# Patient Record
Sex: FEMALE | Race: WHITE | NOT HISPANIC OR LATINO | Employment: FULL TIME | URBAN - METROPOLITAN AREA
[De-identification: names, ages, dates, MRNs, and addresses within clinical notes are randomized per-mention and may not be internally consistent; named-entity substitution may affect disease eponyms.]

---

## 2018-01-31 ENCOUNTER — OFFICE VISIT - RIVER FALLS (OUTPATIENT)
Dept: FAMILY MEDICINE | Facility: CLINIC | Age: 23
End: 2018-01-31

## 2018-01-31 ASSESSMENT — MIFFLIN-ST. JEOR: SCORE: 1961.72

## 2018-08-28 ENCOUNTER — OFFICE VISIT - RIVER FALLS (OUTPATIENT)
Dept: FAMILY MEDICINE | Facility: CLINIC | Age: 23
End: 2018-08-28

## 2018-08-28 ASSESSMENT — MIFFLIN-ST. JEOR: SCORE: 1960.81

## 2019-01-11 ENCOUNTER — OFFICE VISIT - RIVER FALLS (OUTPATIENT)
Dept: FAMILY MEDICINE | Facility: CLINIC | Age: 24
End: 2019-01-11

## 2019-01-11 ASSESSMENT — MIFFLIN-ST. JEOR: SCORE: 2026.13

## 2019-10-02 ENCOUNTER — OFFICE VISIT - RIVER FALLS (OUTPATIENT)
Dept: FAMILY MEDICINE | Facility: CLINIC | Age: 24
End: 2019-10-02

## 2019-10-03 LAB — TSH SERPL DL<=0.005 MIU/L-ACNC: 2.01 MIU/L

## 2019-10-07 ENCOUNTER — COMMUNICATION - RIVER FALLS (OUTPATIENT)
Dept: FAMILY MEDICINE | Facility: CLINIC | Age: 24
End: 2019-10-07

## 2021-02-10 ENCOUNTER — AMBULATORY - RIVER FALLS (OUTPATIENT)
Dept: FAMILY MEDICINE | Facility: CLINIC | Age: 26
End: 2021-02-10

## 2021-02-10 ENCOUNTER — OFFICE VISIT - RIVER FALLS (OUTPATIENT)
Dept: FAMILY MEDICINE | Facility: CLINIC | Age: 26
End: 2021-02-10

## 2021-02-10 LAB — DEPRECATED S PYO AG THROAT QL EIA: DETECTED

## 2021-02-12 ENCOUNTER — COMMUNICATION - RIVER FALLS (OUTPATIENT)
Dept: FAMILY MEDICINE | Facility: CLINIC | Age: 26
End: 2021-02-12

## 2021-02-12 ENCOUNTER — OFFICE VISIT - RIVER FALLS (OUTPATIENT)
Dept: FAMILY MEDICINE | Facility: CLINIC | Age: 26
End: 2021-02-12

## 2021-02-12 LAB
ERYTHROCYTE [DISTWIDTH] IN BLOOD BY AUTOMATED COUNT: 13.1 % (ref 11.5–15.5)
HCT VFR BLD AUTO: 46.4 % (ref 33–51)
HGB BLD-MCNC: 15.6 G/DL (ref 12–16)
MCH RBC QN AUTO: 31 PG (ref 26–34)
MCHC RBC AUTO-ENTMCNC: 33.6 G/DL (ref 32–36)
MCV RBC AUTO: 92 FL (ref 80–100)
PLATELET # BLD AUTO: 228 10*3/UL (ref 140–440)
PMV BLD: 9.4 FL (ref 6.5–11)
RBC # BLD AUTO: 5.04 10*6/UL (ref 4–5.2)
WBC # BLD AUTO: 10.3 10*3/UL (ref 4.5–11)

## 2021-02-12 ASSESSMENT — MIFFLIN-ST. JEOR: SCORE: 1835.85

## 2021-02-14 ENCOUNTER — NURSE TRIAGE (OUTPATIENT)
Dept: NURSING | Facility: CLINIC | Age: 26
End: 2021-02-14

## 2021-02-14 NOTE — TELEPHONE ENCOUNTER
Patient reports she had 2 appointments with a provider in the past week for strep.  Was prescribed 2 different antibiotics along with Tramadol.     Getting a rash on stomach, neck, face and inner arms.  Noticed it a couple days ago and now has not gone away.     Started taking the 1st prescribed antibiotic on 2/10/2020.  Took on the 10, 11 and morning of the 12th.     Started new antibiotic on the afternoon of the 12th.  Also started Tramadol on the 12th.     Noticed the rash prior to starting the 2nd antibiotic and tramadol.     Rash does not itch.     Advised to see PCP within 24 hours per protocol and stop taking new medications until able to see MD.     Alondra Varner RN/[facility]    COVID 19 Nurse Triage Plan/Patient Instructions    Please be aware that novel coronavirus (COVID-19) may be circulating in the community. If you develop symptoms such as fever, cough, or SOB or if you have concerns about the presence of another infection including coronavirus (COVID-19), please contact your health care provider or visit [email or web address].     Disposition/Instructions    Virtual Visit with provider recommended. Reference Visit Selection Guide.    Thank you for taking steps to prevent the spread of this virus.  o Limit your contact with others.  o Wear a simple mask to cover your cough.  o Wash your hands well and often.    Resources    [facility]: About COVID-19: [email or web address]    CDC: What to Do If You're Sick: www.cdc.gov/coronavirus/2019-ncov/about/steps-when-sick.html    CDC: Ending Home Isolation: www.cdc.gov/coronavirus/2019-ncov/hcp/disposition-in-home-patients.html     CDC: Caring for Someone: www.cdc.gov/coronavirus/2019-ncov/if-you-are-sick/care-for-someone.html     [facility]: Interim Guidance for Hospital Discharge to Home: www.health.[facility].[place].us/diseases/coronavirus/hcp/hospdischarge.pdf    [facility] clinical trials (COVID-19 research studies):  clinicalaffairs.[facility]-clinical-trials     Below are the COVID-19 hotlines at the [place] Department of Health ([facility]). Interpreters are available.   o For health questions: Call 543-859-4493 or 1-398.281.4114 (7 a.m. to 7 p.m.)  o For questions about schools and childcare: Call 768-982-4360 or 1-959.470.3329 (7 a.m. to 7 p.m.)     Reason for Disposition    Taking new prescription antibiotic (EXCEPTION: finished taking new prescription antibiotic)    Additional Information    Negative: [1] Life-threatening reaction (anaphylaxis) in the past to the same drug AND [2] < 2 hours since exposure    Negative: Difficulty breathing or wheezing    Negative: [1] Hoarseness or cough AND [2] started soon after 1st dose of drug    Negative: [1] Swollen tongue AND [2] started soon after 1st dose of drug    Negative: [1] Purple or blood-colored rash (spots or dots) AND [2] fever    Negative: Sounds like a life-threatening emergency to the triager    Negative: Rash is only on 1 part of the body (localized)    Negative: Taking new non-prescription (OTC) antihistamine, decongestant, ear drops, eye drops, or other OTC cough/cold medicine    Negative: Taking new prescription antihistamine, allergy medicine, asthma medicine, eye drops, ear drops or nose drops    Negative: Rash started more than 3 days after stopping new prescription medicine    Negative: Swollen tongue    Negative: [1] Widespread hives AND [2] onset < 2 hours of exposure to 1st dose of drug    Negative: Fever    Negative: Patient sounds very sick or weak to the triager    Negative: [1] Purple or blood-colored rash (spots or dots) AND [2] no fever AND [3] sounds well to triager    Negative: [1] Taking new prescription medication AND [2] rash within 4 hours of 1st dose    Negative: Large or small blisters on skin (i.e., fluid filled bubbles or sacs)    Negative: Bloody crusts on lips or sores in mouth    Negative: Face or lip swelling    Negative: Hives or  itching    Protocols used: RASH - WIDESPREAD ON DRUGS-A-AH

## 2021-05-24 ENCOUNTER — OFFICE VISIT - RIVER FALLS (OUTPATIENT)
Dept: FAMILY MEDICINE | Facility: CLINIC | Age: 26
End: 2021-05-24

## 2021-05-24 ASSESSMENT — MIFFLIN-ST. JEOR: SCORE: 1858.52

## 2021-05-27 ENCOUNTER — COMMUNICATION - RIVER FALLS (OUTPATIENT)
Dept: FAMILY MEDICINE | Facility: CLINIC | Age: 26
End: 2021-05-27

## 2021-05-27 LAB
BACTERIA SPEC CULT: ABNORMAL
CHLAMYDIA TRACHOMATIS RNA, TMA - QUEST: NOT DETECTED
NEISSERIA GONORRHOEAE RNA TMA: NOT DETECTED

## 2022-02-12 VITALS
DIASTOLIC BLOOD PRESSURE: 72 MMHG | WEIGHT: 258 LBS | HEART RATE: 94 BPM | SYSTOLIC BLOOD PRESSURE: 124 MMHG | TEMPERATURE: 98.9 F | BODY MASS INDEX: 38.21 KG/M2 | OXYGEN SATURATION: 97 % | HEIGHT: 69 IN

## 2022-02-12 VITALS
SYSTOLIC BLOOD PRESSURE: 108 MMHG | WEIGHT: 272.2 LBS | BODY MASS INDEX: 40.79 KG/M2 | DIASTOLIC BLOOD PRESSURE: 72 MMHG | OXYGEN SATURATION: 99 % | HEART RATE: 84 BPM | TEMPERATURE: 98.4 F

## 2022-02-12 VITALS
HEIGHT: 69 IN | SYSTOLIC BLOOD PRESSURE: 128 MMHG | TEMPERATURE: 98.9 F | WEIGHT: 257.8 LBS | DIASTOLIC BLOOD PRESSURE: 76 MMHG | HEART RATE: 80 BPM | OXYGEN SATURATION: 98 % | BODY MASS INDEX: 38.18 KG/M2

## 2022-02-12 VITALS
TEMPERATURE: 99.6 F | BODY MASS INDEX: 40.32 KG/M2 | HEART RATE: 94 BPM | OXYGEN SATURATION: 97 % | SYSTOLIC BLOOD PRESSURE: 118 MMHG | DIASTOLIC BLOOD PRESSURE: 78 MMHG | WEIGHT: 272.2 LBS | HEIGHT: 69 IN

## 2022-02-12 VITALS
HEIGHT: 68 IN | BODY MASS INDEX: 35.16 KG/M2 | TEMPERATURE: 98.6 F | DIASTOLIC BLOOD PRESSURE: 64 MMHG | SYSTOLIC BLOOD PRESSURE: 118 MMHG | WEIGHT: 232 LBS | HEART RATE: 82 BPM

## 2022-02-12 VITALS
HEART RATE: 102 BPM | HEIGHT: 68 IN | WEIGHT: 237 LBS | SYSTOLIC BLOOD PRESSURE: 90 MMHG | OXYGEN SATURATION: 99 % | BODY MASS INDEX: 35.92 KG/M2 | TEMPERATURE: 97.4 F | DIASTOLIC BLOOD PRESSURE: 62 MMHG

## 2022-02-16 NOTE — TELEPHONE ENCOUNTER
---------------------  From: Agustin Paredes PA-C   To: [facility] ([id number]);     Sent: 2/10/2021 11:59:04 AM CST  Subject: General Message     Please schedule for drive through strep test only.  Thx    KAHScheduled

## 2022-02-16 NOTE — TELEPHONE ENCOUNTER
I called with positive strep test. I sent in Zithromax as she has a Amoxil allergy. Symptomatic treatment as well. She should be improved in 36-48 hours, if not or if worse, to call.    KAH

## 2022-02-16 NOTE — PROGRESS NOTES
Patient:   SHKURI MAN            MRN: [id number]            FIN: [id number]               Age:   17 years     Sex:  Female     :  1995   Associated Diagnoses:   None   Author:   Alcides Craft MD      Her mono test was positive. As I discussed with her yesterday in the clinic, she should avoid contact sports for one month following resolution of her sore throat. We also reiterated treatment for sore throat. Should she develop unusual addominal pain, she needs to seek care immediately. She is contagious and this virus is spread by saliva. If her throat is getting worse or she's having increasing difficulty swallowing, she should follow-up in the clinic.

## 2022-02-16 NOTE — NURSING NOTE
Comprehensive Intake Entered On:  5/24/2021 2:31 PM CDT    Performed On:  5/24/2021 2:27 PM CDT by Linda Montoya LPN               Summary   Chief Complaint :   possible UTI, symptoms started yesterday, frequency, dysuria, no hematuria, no abnormal discharge   Weight Measured :   237 lb(Converted to: 237 lb 0 oz, 107.501 kg)    Height Measured :   68 in(Converted to: 5 ft 8 in, 172.72 cm)    Body Mass Index :   36.03 kg/m2 (HI)    Body Surface Area :   2.27 m2   Systolic Blood Pressure :   90 mmHg   Diastolic Blood Pressure :   62 mmHg   Mean Arterial Pressure :   71 mmHg   Peripheral Pulse Rate :   102 bpm (HI)    BP Site :   Right arm   BP Method :   Manual   Temperature Tympanic :   97.4 DegF(Converted to: 36.3 DegC)  (LOW)    Oxygen Saturation :   99 %   Race :      Languages :   English   Ethnicity :   Not  or    Linda Montoya LPN - 5/24/2021 2:27 PM CDT   Health Status   Allergies Verified? :   Yes   Medication History Verified? :   Yes   Medical History Verified? :   No   Pre-Visit Planning Status :   Completed   Tobacco Use? :   Never smoker   Linda Montoya LPN - 5/24/2021 2:27 PM CDT   Meds / Allergies   (As Of: 5/24/2021 2:31:55 PM CDT)   Allergies (Active)   amoxicillin  Estimated Onset Date:   Unspecified ; Reactions:   hives ; Created By:   Katya Giles CMA; Reaction Status:   Active ; Category:   Drug ; Substance:   amoxicillin ; Type:   Allergy ; Updated By:   Katya Giles CMA; Source:   Parent ; Reviewed Date:   2/12/2021 10:56 AM CST        Medication List   (As Of: 5/24/2021 2:31:55 PM CDT)   Home Meds    etonogestrel  :   etonogestrel ; Status:   Documented ; Ordered As Mnemonic:   Nexplanon 68 mg subcutaneous implant ; Simple Display Line:   68 mg, 1 EA, subcutaneous, once, 0 Refill(s) ; Catalog Code:   etonogestrel ; Order Dt/Tm:   1/31/2018 3:52:42 PM CST            ID Risk Screen   Recent Travel History :   No recent travel   Family Member Travel History :    No recent travel   Other Exposure to Infectious Disease :   Unknown   COVID-19 Testing Status :   Positive COVID-19 test greater than 30 days ago   Linda Montoya LPN - 5/24/2021 2:27 PM CDT

## 2022-02-16 NOTE — NURSING NOTE
Comprehensive Intake Entered On:  1/11/2019 1:34 PM CST    Performed On:  1/11/2019 1:28 PM CST by Stephy Salazar               Summary   Chief Complaint :   c/o runny nose, sinus congestion, cough, low grade fever and chills x3-4 weeks   Menstrual Status :   Prophylaxis   Weight Measured :   272.2 lb(Converted to: 272 lb 3 oz, 123.47 kg)    Height Measured :   68.5 in(Converted to: 5 ft 8 in, 173.99 cm)    Body Mass Index :   40.78 kg/m2 (HI)    Body Surface Area :   2.44 m2   Systolic Blood Pressure :   118 mmHg   Diastolic Blood Pressure :   78 mmHg   Mean Arterial Pressure :   91 mmHg   Peripheral Pulse Rate :   94 bpm   BP Site :   Right arm   Pulse Site :   Radial artery   BP Method :   Manual   HR Method :   Electronic   Temperature Tympanic :   99.6 DegF(Converted to: 37.6 DegC)    Oxygen Saturation :   97 %   Race :      Languages :   English   Ethnicity :   Not  or    Stephy Salazar - 1/11/2019 1:28 PM CST   Health Status   Allergies Verified? :   Yes   Medication History Verified? :   Yes   Medical History Verified? :   Yes   Pre-Visit Planning Status :   Completed   Tobacco Use? :   Never smoker   Stephy Salazar - 1/11/2019 1:28 PM CST   Consents   Consent for Immunization Exchange :   Consent Granted   Consent for Immunizations to Providers :   Consent Granted   Stephy Salazar - 1/11/2019 1:28 PM CST   Meds / Allergies   (As Of: 1/11/2019 1:34:36 PM CST)   Allergies (Active)   amoxicillin  Estimated Onset Date:   Unspecified ; Reactions:   hives ; Created By:   Katya Giles CMA; Reaction Status:   Active ; Category:   Drug ; Substance:   amoxicillin ; Type:   Allergy ; Updated By:   Katya Giles CMA; Source:   Parent ; Reviewed Date:   1/11/2019 1:34 PM CST        Medication List   (As Of: 1/11/2019 1:34:36 PM CST)   Prescription/Discharge Order    albuterol  :   albuterol ; Status:   Processing ; Ordered As Mnemonic:   albuterol 90 mcg/inh inhalation aerosol ;  Ordering Provider:   Namita Low; Action Display:   Complete ; Catalog Code:   albuterol ; Order Dt/Tm:   1/11/2019 1:34:22 PM          azithromycin  :   azithromycin ; Status:   Processing ; Ordered As Mnemonic:   Azithromycin 5 Day Dose Pack 250 mg oral tablet ; Ordering Provider:   Namita Low; Action Display:   Complete ; Catalog Code:   azithromycin ; Order Dt/Tm:   1/11/2019 1:34:22 PM          Miscellaneous Rx Supply  :   Miscellaneous Rx Supply ; Status:   Processing ; Ordered As Mnemonic:   Dispense appropriate sized mask and dispense valve chamber (such as aerochamber) that will fit MDI... ; Ordering Provider:   Namita Low; Action Display:   Complete ; Catalog Code:   Miscellaneous Rx Supply ; Order Dt/Tm:   1/11/2019 1:34:22 PM            Home Meds    etonogestrel  :   etonogestrel ; Status:   Documented ; Ordered As Mnemonic:   Nexplanon 68 mg subcutaneous implant ; Simple Display Line:   68 mg, 1 EA, subcutaneous, once, 0 Refill(s) ; Catalog Code:   etonogestrel ; Order Dt/Tm:   1/31/2018 3:52:42 PM

## 2022-02-16 NOTE — NURSING NOTE
Comprehensive Intake Entered On:  10/2/2019 8:55 AM CDT    Performed On:  10/2/2019 8:49 AM CDT by Linda Montoya LPN               Summary   Chief Complaint :   1) right ear pain x3 days, this morning throat is sore when swallowing, no known fever 2) would like to have thyroid checked   Menstrual Status :   Prophylaxis   Weight Measured :   272.2 lb(Converted to: 272 lb 3 oz, 123.47 kg)    Systolic Blood Pressure :   108 mmHg   Diastolic Blood Pressure :   72 mmHg   Mean Arterial Pressure :   84 mmHg   Peripheral Pulse Rate :   84 bpm   BP Site :   Right arm   BP Method :   Manual   Temperature Tympanic :   98.4 DegF(Converted to: 36.9 DegC)    Oxygen Saturation :   99 %   Race :      Languages :   English   Ethnicity :   Not  or    Linda Montoya LPN - 10/2/2019 8:49 AM CDT   Health Status   Allergies Verified? :   Yes   Medication History Verified? :   Yes   Medical History Verified? :   Yes   Pre-Visit Planning Status :   Not completed   Tobacco Use? :   Never smoker   Linda Montoya LPN - 10/2/2019 8:49 AM CDT   Meds / Allergies   (As Of: 10/2/2019 8:55:03 AM CDT)   Allergies (Active)   amoxicillin  Estimated Onset Date:   Unspecified ; Reactions:   hives ; Created By:   Katya Giles; Reaction Status:   Active ; Category:   Drug ; Substance:   amoxicillin ; Type:   Allergy ; Updated By:   Katya Giles; Source:   Parent ; Reviewed Date:   1/11/2019 1:34 PM CST        Medication List   (As Of: 10/2/2019 8:55:03 AM CDT)   Home Meds    etonogestrel  :   etonogestrel ; Status:   Documented ; Ordered As Mnemonic:   Nexplanon 68 mg subcutaneous implant ; Simple Display Line:   68 mg, 1 EA, subcutaneous, once, 0 Refill(s) ; Catalog Code:   etonogestrel ; Order Dt/Tm:   1/31/2018 3:52:42 PM

## 2022-02-16 NOTE — PROGRESS NOTES
Patient:   SHUKRI MAN            MRN: [id number]            FIN: [id number]               Age:   26 years     Sex:  Female     :  1995   Associated Diagnoses:   Acute cystitis   Author:   Namita Low      Chief Complaint   2021 2:27 PM CDT    possible UTI, symptoms started yesterday, frequency, dysuria, no hematuria, no abnormal discharge      History of Present Illness   rare hx UTI  onset yesterday frequency, dysuria.  no vaginal symptoms  some spotting with Nexplanon  no steady partner            Review of Systems   Constitutional:  No fever, No chills.    Gastrointestinal:  No nausea, No vomiting.       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  Comments:  10/2/2019 9:27 AM CDT - Linda Montoya LPN  Nexplanon placed into left arm  [facility]  Ear tube placement in  at 1 Years.   Social History:        Electronic Cigarette/Vaping Assessment            Electronic Cigarette Use: Never.      Alcohol Assessment:  Current            Current, 1-2 times per week      Tobacco Assessment: Denies Tobacco Use            Never (less than 100 in lifetime)            Never      Substance Abuse Assessment: Denies Substance Abuse            Never      Nutrition and Health Assessment            Type of diet: Regular.      Exercise and Physical Activity Assessment: Does not exercise      Sexual Assessment            Sexually active: Yes.  Sexual orientation: Heterosexual.  Uses condoms: Yes.        Physical Examination   Vital Signs   5/24/2021 2:27 PM CDT Temperature Tympanic 97.4 DegF  LOW    Peripheral Pulse Rate 102 bpm  HI    Systolic Blood Pressure 90 mmHg    Diastolic Blood Pressure 62 mmHg    Mean Arterial Pressure 71 mmHg    BP Site Right arm    BP Method Manual    Oxygen Saturation 99 %      Measurements from flowsheet : Measurements   5/24/2021 2:27 PM CDT Height Measured - Standard 68 in    Weight Measured - Standard 237 lb    BSA 2.27 m2    Body Mass Index 36.03 kg/m2  HI      General:  Alert and oriented, No acute distress.    Genitourinary:  No costovertebral angle tenderness.    Integumentary:  Warm, Dry, Pink, No rash.       Review / Management   Results review:  UA reviewed, suggestive of UTI.       Impression and Plan   Diagnosis     Acute cystitis (HZD25-TC N30.00).     Patient Instructions:       Counseled: Patient, Regarding diagnosis, Regarding treatment, Regarding medications, Verbalized understanding.    Orders     Orders (Selected)   Outpatient Orders  Ordered (In Transit)  Chlamydia/Neisseria gonorrhoeae RNA, TMA* ([facility]): Specimen Type: Urine, Collection Date: 05/24/21 14:38:00 CDT  Culture, Urine, Routine* ([facility]): Specimen Type: Urine (Clean Catch), Collection Date: 05/24/21 14:38:00 CDT  Prescriptions  Prescribed  Bactrim  mg-160 mg oral tablet: 1 tab(s), Oral, bid, x 3 day(s), # 6 tab(s), 0 Refill(s), Type: Acute, Pharmacy: [id number], 1 tab(s) Oral bid,x3 day(s), 68, in, 05/24/21  14:27:00 CDT, Height Measured, 237, lb, 05/24/21 14:27:00 CDT, Weight Measured.     Make a practice of consuming adequate water daily (between 6-8 glasses), do not 'hold' your urine,  avoid bladder irritants--soda, caffeine, alcohol   Return to the clinic for re evaluation if worsening or simply not improving.   .

## 2022-02-16 NOTE — PROGRESS NOTES
Patient:   SHUKRI MAN            MRN: [id number]            FIN: [id number]               Age:   25 years     Sex:  Female     :  1995   Associated Diagnoses:   Sore throat   Author:   Agustin Paredes PA-C      Report Summary   Diagnosis  Sore throat (ZCY85-WO J02.9).  Patient InstructionsSummary   Visit Information      Date of Service: 02/10/2021 11:40 am  Performing Location: [facility]  Encounter#: [id number]      Primary Care Provider (PCP):  Namita Low    NPI# [id number]      Referring Provider:  Agustin Paredes PA-C    NPI# [id number]   Visit type:  Video Visit via [employer] or [employer].    Participants in room during visit:  2   Location of patient:  Work  Location of provider:  _ (Clinic office )  Video Start Time:  1152  Video End Time:   1157    Today's visit was conducted via video conference due to the COVID-19 pandemic.  The patient's consent to proceed with a video visit has been obtained and documented.      Chief Complaint   Sore throat      History of Present Illness   Patient is a 25 year old F who is being evaluated via a billable video visit. Sore throat x 36 hours. Able to get solids and fluids down. No fever. Had Mono previously. Some adenopathy. Works at [employer]. Negative Covid test this AM. No current cases with residents and staff. No nausea. Hasn't taken Tylenol or Advil, etc.       Review of Systems   Constitutional:  Negative.    Eye:  Negative.    Ear/Nose/Mouth/Throat:  Negative except as documented in history of present illness.    Respiratory:  Negative.    Cardiovascular:  Negative.    Gastrointestinal:  Negative.    Genitourinary:  Negative.    Immunologic:  Negative.    Musculoskeletal:  Negative.    Integumentary:  Negative.    Neurologic:  Negative.    Psychiatric:  Negative.       Health Status   Allergies:    Allergic Reactions (Selected)  Severity Not Documented  Amoxicillin (Hives)   Medications:  (Selected)    Prescriptions  Prescribed  cefuroxime 500 mg oral tablet: = 1 tab(s) ( 500 mg ), PO, bid, # 10 tab(s), 0 Refill(s), Type: Maintenance, Pharmacy: [facility], 1 tab(s) Oral bid,x5 day(s)  hydrocortisone/neomycin/polymyxin B 1%-0.35%-10,000 units/mL otic solution: 4 drop(s), Ear-Right, tid, # 10 mL, 0 Refill(s), Type: Maintenance, Pharmacy: [facility], 4 drop(s) Ear-Right tid,x5 day(s)  Documented Medications  Documented  Nexplanon 68 mg subcutaneous implant: 1 EA ( 68 mg ), subcutaneous, once, 0 Refill(s), Type: Maintenance   Problem list:    All Problems  Obese / ICD-9-.00 / Probable  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  Comments:  10/2/2019 9:27 AM CDT - Linda Montoya LPN  Nexplanon placed into left arm  [facility]  Ear tube placement in 1996 at 1 Years.   Social History:        Electronic Cigarette/Vaping Assessment            Electronic Cigarette Use: Never.      Alcohol Assessment: Current            Current, 1-2 times per week      Tobacco Assessment: Denies Tobacco Use            Never (less than 100 in lifetime)            Never      Substance Abuse Assessment: Denies Substance Abuse            Never      Nutrition and Health Assessment            Type of diet: Regular.      Exercise and Physical Activity Assessment: Does not exercise      Sexual Assessment            Sexually active: Yes.  Sexual  orientation: Heterosexual.  Uses condoms: Yes.        Physical Examination   General:  Alert and oriented, No acute distress.    Eye:  Pupils are equal, round and reactive to light, Normal conjunctiva.    HENT:  Oral mucosa is moist.    Neck:  Supple.    Respiratory:  Respirations are non-labored.    Psychiatric:  Cooperative, Appropriate mood & affect, Normal judgment.       Impression and Plan   Diagnosis     Sore throat (PSY53-GB J02.9).     Patient Instructions:       Counseled: Patient, Regarding medications, Diet, Activity.    Summary:  Will do rapid strep test and treat if positive. Advised symptomatic therapy. FU if not improved over next week, or if worse, unable to swallow etc..       Health Maintenance      Recommendations     Pending (in the next year)        OverDue           Alcohol Misuse Screen due  04/10/16  and every 1  year(s)           Depression Screen (Female) due  04/10/16  and every 1  year(s)           Body Mass Index Check due  01/11/20  and every 1  year(s)           Influenza Vaccine due  09/01/20  and every 1  year(s)           High Blood Pressure Screen (Female) due  10/02/20  and every 1  year(s)           Obesity Screen and Counseling (Female) due  10/02/20  and every 1  year(s)        Due            Intimate Partner Violence Screening due  02/10/21  and every 1  year(s)     Satisfied (in the past 1 year)        Satisfied            Tobacco Use Screen (Female) on  02/10/21.

## 2022-02-16 NOTE — PROGRESS NOTES
Patient:   SHUKRI MAN            MRN: [id number]            FIN: [id number]               Age:   24 years     Sex:  Female     :  1995   Associated Diagnoses:   Right otitis externa; Right otitis media; Screening for thyroid disorder; Skin cancer screening   Author:   Namita Low      Visit Information      Date of Service: 10/02/2019 08:40 am  Performing Location: [facility]  Encounter#: [id number]      Primary Care Provider (PCP):  Namita Low    NPI# [id number]      Referring Provider:  Namita Low NPI# [id number]      Chief Complaint   10/2/2019 8:49 AM CDT    1) right ear pain x3 days, this morning throat is sore when swallowing, no known fever 2) would like to have thyroid checked      History of Present Illness   Right ear pain 3 days, wear ear plugs due to partner's snoring at hs but it is really bothering her. Hurts in the glands throat even. No nasal congestion, no cough, no OTC used.    was at [facility] for new Nexplanon insert and annual exam with pap (Done in 2019). Was told while at appt to have thyroid checked. no FH . Has long of irregular and rare menses    would like skin exam, has many freckles, moles on back were itchy but that resolved. no Hx of skin cancer      Health Status   Allergies:    Allergic Reactions (Selected)  Severity Not Documented  Amoxicillin (Hives)   Medications:  (Selected)   Prescriptions  Prescribed  cefuroxime 500 mg oral tablet: = 1 tab(s) ( 500 mg ), PO, bid, # 10 tab(s), 0 Refill(s), Type: Maintenance, Pharmacy: [facility], 1 tab(s) Oral bid,x5 day(s)  hydrocortisone/neomycin/polymyxin B 1%-0.35%-10,000 units/mL otic solution: 4 drop(s), Ear-Right, tid, # 10 mL, 0 Refill(s), Type: Maintenance, Pharmacy: [facility], 4 drop(s) Ear-Right tid,x5 day(s)  Documented Medications  Documented  Nexplanon 68 mg subcutaneous implant: 1 EA ( 68 mg ), subcutaneous, once, 0 Refill(s), Type: Maintenance   Problem list:    All  Problems  ADHD (Attention Deficit Hyperactivity Disorder) / ICD-9-.01 / Confirmed  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   Social History:        Alcohol Assessment: Current            Current, 1-2 times per week      Tobacco Assessment: Denies Tobacco Use            Never      Substance Abuse Assessment: Denies Substance Abuse            Never      Nutrition and Health Assessment            Type of diet: Regular.      Exercise and Physical Activity Assessment: Does not exercise      Sexual Assessment            Sexually active: Yes.  Sexual orientation: Heterosexual.  Uses condoms: Yes.        Physical Examination   Vital Signs   10/2/2019 8:49 AM CDT Temperature Tympanic 98.4 DegF    Peripheral Pulse Rate 84 bpm    Systolic Blood Pressure 108 mmHg    Diastolic Blood Pressure 72 mmHg    Mean Arterial Pressure 84 mmHg    BP Site Right arm    BP Method Manual    Oxygen Saturation 99 %      Measurements from flowsheet : Measurements   10/2/2019 8:49 AM CDT    Weight Measured - Standard                272.2 lb     General:  Alert and oriented, No acute distress.    Eye:  Normal conjunctiva.    HENT:  Normal hearing, Oral mucosa is moist, No pharyngeal erythema, Right TM and ear canal injected and poor light reflex.    Neck:  Supple, Non-tender, No lymphadenopathy, little thyroid thickening, no masses.    Respiratory:  Lungs are clear to auscultation, Respirations are non-labored, Breath sounds are equal, Symmetrical  chest wall expansion.    Cardiovascular:  Normal rate, Regular rhythm, No murmur.    Musculoskeletal:  Normal range of motion, Normal gait.    Integumentary:  Warm, Dry, Pink, No rash, torso ant/post and arms examine, face has cover up on it. Used dermoscope. Many freckles, no worrisome lesions.    Neurologic:  Alert, Oriented.    Psychiatric:  Cooperative.       Impression and Plan   Diagnosis     Right otitis externa (FQG46-IN H60.91).     Right otitis media (TBX91-FU H66.91).     Screening for thyroid disorder (MLU54-PC Z13.29).     Skin cancer screening (AHS22-CB Z12.83).     Patient Instructions:       Counseled: Patient, Regarding diagnosis, Regarding treatment, Regarding medications, Verbalized understanding, Counseled on symptomatic management. Return to clinic for re evaluation if worsening, simply not improving, or failure to resolve.   , good skin protection  CLARISSE signed for pap results.    Orders     Orders (Selected)   Outpatient Orders  Ordered (Dispatched)  TSH* ([facility]): Specimen Type: Serum, Collection Date: 10/02/19 9:12:00 CDT  Prescriptions  Prescribed  cefuroxime 500 mg oral tablet: = 1 tab(s) ( 500 mg ), PO, bid, # 10 tab(s), 0 Refill(s), Type: Maintenance, Pharmacy: [facility], 1 tab(s) Oral bid,x5 day(s)  hydrocortisone/neomycin/polymyxin B 1%-0.35%-10,000 units/mL otic solution: 4 drop(s), Ear-Right, tid, # 10 mL, 0 Refill(s), Type: Maintenance, Pharmacy: [facility], 4 drop(s) Ear-Right tid,x5 day(s).

## 2022-02-16 NOTE — PROGRESS NOTES
Patient:   SHUKRI MAN            MRN: [id number]            FIN: [id number]               Age:   23 years     Sex:  Female     :  1995   Associated Diagnoses:   Acute bacterial bronchitis   Author:   Namita Low      Visit Information      Date of Service: 2018 12:56 pm  Performing Location: [facility]  Encounter#: [id number]      Primary Care Provider (PCP):  Namita Low    NPI# [id number]      Referring Provider:  Esa Diallo MD    NPI# [id number]      Chief Complaint   2018 12:58 PM CDT   c/o cough, chest congestion, nasal congestion, diarrhea, body aches, fatigue for the past  month and for the past week getting worse        History of Present Illness   reviewed presenting problem as above with patient  no fever initially but more chilled now  has never had seasonal allergies  ears feel full  good po intake  cough is the biggest problem  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    Respiratory:  Cough, No shortness of breath, No wheezing.    Gastrointestinal:  No nausea, No vomiting, No diarrhea.              Health Status   Allergies:    Allergic Reactions (Selected)  Severity Not Documented  Amoxicillin (Hives)   Medications:  (Selected)   Prescriptions  Prescribed  Azithromycin 5 Day Dose Pack 250 mg oral tablet: 500mg day 1, 250 mg day 2-5, PO, Daily, # 6 tab(s), 0 Refill(s), Type: Maintenance, Pharmacy: [id number], 500mg day 1, 250 mg day 2-5 Oral daily  Dispense appropriate sized mask and dispense valve chamber (such as aerochamber) that will fit MDI...: Dispense appropriate sized mask and dispense valve chamber (such as aerochamber) that will fit MDI, See Instructions, Instructions: use with metered dose inhaler as directed, Supply, # 2 EA, 0 Refill(s), Type: Maintenance, Pharmacy: [facility]...  albuterol 90 mcg/inh inhalation aerosol:  2 puff(s), inh, qid, Instructions: dispense with Valved Chamber please  use with spacer chamber, PRN: as needed for wheezing, # 18 gm, 1 Refill(s), Type: Maintenance, Pharmacy: [facility] #[id number], 2 puff(s) Inhale qid,PRN:as needed for wheezing,Ins...  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   Social History:        Alcohol Assessment: Current            Current, 1-2 times per week      Tobacco Assessment: Denies Tobacco Use            Never      Substance Abuse Assessment: Denies Substance Abuse            Never      Nutrition and Health Assessment            Type of diet: Regular.      Exercise and Physical Activity Assessment: Does not exercise      Sexual Assessment            Sexually active: Yes.  Sexual orientation: Heterosexual.  Uses condoms: Yes.        Physical Examination   Vital Signs   8/28/2018 12:58 PM CDT Temperature Tympanic 98.9 DegF    Peripheral Pulse Rate 80 bpm    Pulse Site Radial artery    HR Method Manual    Systolic Blood Pressure 128 mmHg    Diastolic Blood Pressure 76 mmHg    Mean Arterial Pressure 93 mmHg    BP Site Right arm    BP Method Manual    Oxygen Saturation 98 %      Measurements from flowsheet : Measurements   8/28/2018 12:58 PM CDT  Height Measured - Standard 68.5 in    Weight Measured - Standard 257.8 lb    BSA 2.37 m2    Body Mass Index 38.62 kg/m2  HI      General:  Alert and oriented, No acute distress.    Eye:  Normal conjunctiva.    HENT:  Normal hearing, Oral mucosa is moist, No pharyngeal erythema, No sinus tenderness, both TMs mildly injected and slight bulge  upper fields of chest course with auscultation , some clearing with cough.    Neck:  Supple, Non-tender, No lymphadenopathy.    Respiratory:  Lungs are clear to auscultation, Respirations are non-labored, Breath sounds are equal, Symmetrical chest wall expansion.    Cardiovascular:  Normal rate, Regular rhythm, No murmur.    Musculoskeletal:  Normal range of motion, Normal gait.    Integumentary:  Warm, Dry, Pink, No rash.    Neurologic:  Alert, Oriented.    Psychiatric:  Cooperative.       Impression and Plan   Diagnosis     Acute bacterial bronchitis (RFE12-NL J20.8).     Patient Instructions:       Counseled: Patient, Regarding diagnosis, Regarding treatment, Regarding medications, Verbalized understanding, Counseled on symptomatic management. Return to clinic for re evaluation if worsening, simply not improving, or failure to resolve.   .    Orders     Orders (Selected)   Prescriptions  Prescribed  Azithromycin 5 Day Dose Pack 250 mg oral tablet: 500mg day 1, 250 mg day 2-5, PO, Daily, # 6 tab(s), 0 Refill(s), Type: Maintenance, Pharmacy: [facility] #[id number], 500mg day 1, 250 mg day 2-5 Oral daily  Dispense appropriate sized mask and dispense valve chamber (such as aerochamber) that will fit MDI...: Dispense appropriate sized mask and dispense valve chamber (such as aerochamber) that will fit MDI, See Instructions, Instructions: use with metered dose inhaler as directed, Supply, # 2 EA, 0 Refill(s), Type: Maintenance, Pharmacy: [facility] #[facility]...  albuterol 90 mcg/inh inhalation aerosol: 2 puff(s), inh, qid, Instructions: dispense with Valved Chamber please  use with  spacer chamber, PRN: as needed for wheezing, # 18 gm, 1 Refill(s), Type: Maintenance, Pharmacy: [facility] #[id number], 2 puff(s) Inhale qid,PRN:as needed for wheezing,Ins....

## 2022-02-16 NOTE — PROGRESS NOTES
Patient:   SHUKRI MAN            MRN: [id number]            FIN: [id number]               Age:   22 years     Sex:  Female     :  1995   Associated Diagnoses:   Acute maxillary sinusitis   Author:   Kirk Zaragoza PA-C      Chief Complaint   2018 3:49 PM CST    pt here for sore throat, runny nose, scratchy throat, coughing, says symptoms have been on and off for three months, they will go away and then come back, no fevers, has had some episodes of sweating, fatigued and weak, no vomiting,  does have congestion        History of Present Illness   Chief complaint and symptoms noted above and confirmed with patient   as above,  sxs have been intermittent for 3 months  has been using theraflu, cough drops, severe cold and flu med      Review of Systems   Constitutional:  Sweats, Weakness, Fatigue, No fever.    Ear/Nose/Mouth/Throat:  Nasal congestion, Sore throat.    Respiratory:  Cough.    Gastrointestinal:  No vomiting.       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   Social History:        Alcohol Assessment: Current            Current, 1-2 times per week      Tobacco  Assessment: Denies Tobacco Use            Never      Substance Abuse Assessment: Denies Substance Abuse            Never      Nutrition and Health Assessment            Type of diet: Regular.      Exercise and Physical Activity Assessment: Does not exercise      Sexual Assessment            Sexually active: Yes.  Sexual orientation: Heterosexual.  Uses condoms: Yes.        Physical Examination   Vital Signs   1/31/2018 3:49 PM CST Temperature Tympanic 98.9 DegF    Peripheral Pulse Rate 94 bpm    Pulse Site Radial artery    Systolic Blood Pressure 124 mmHg    Diastolic Blood Pressure 72 mmHg    Mean Arterial Pressure 89 mmHg    BP Site Right arm    Oxygen Saturation 97 %      Measurements from flowsheet : Measurements   1/31/2018 3:49 PM CST Height Measured - Standard 68.5 in    Weight Measured - Standard 258 lb    BSA 2.38 m2    Body Mass Index 38.65 kg/m2  HI      General:  No acute distress.    HENT:  Tympanic membranes are clear, No sinus tenderness, ooropharynx mildly enlarged and inflamed without exudate, nares are patent, but nasal turbinates are inflamed and narrowed with some purulent drainage..    Neck:  Supple, Non-tender, No lymphadenopathy.    Respiratory:  lungs have coarse ronchi bilaterally, no wheezes, no rales.    Cardiovascular:  Normal rate, Regular rhythm, No murmur.       Impression and Plan   Diagnosis     Acute maxillary sinusitis (FUE27-ZS J01.00).     Patient Instructions:    Encouraged to use heat over the sinuses, salt water nasal spray, decongestants and expectorants, NSAIDs.  Follow up if not improving.    Summary:  will treat with cefuroxime and atrovent nasal spray.    Orders     Orders   Pharmacy:  Atrovent 42 mcg/inh nasal spray (Prescribe): 2 spray(s), nasal, qid, PRN: for nasal congestion, # 1 EA, 2 Refill(s), Type: Maintenance, Pharmacy: [facility] #[id number], 2 spray(s) nasal qid,PRN:for nasal congestion  cefuroxime 500 mg oral tablet (Prescribe): 1 tab(s) ( 500 mg ), PO, BID, x  10 day(s), # 20 tab(s), 0 Refill(s), Type: Maintenance, Pharmacy: [facility] #[id number], 1 tab(s) po bid,x10 day(s).     Orders   Charges (Evaluation and Management):  99500 office outpatient visit 15 minutes (Charge) (Order): Quantity: 1, Acute maxillary sinusitis.

## 2022-02-16 NOTE — PROGRESS NOTES
Patient:   SHUKRI MAN            MRN: [id number]            FIN: [id number]               Age:   23 years     Sex:  Female     :  1995   Associated Diagnoses:   Bronchitis   Author:   Namita Low      Visit Information      Date of Service: 2019 01:20 pm  Performing Location: [facility]  Encounter#: [id number]      Primary Care Provider (PCP):  Namita Low    NPI# [id number]      Referring Provider:  Namita Low    NPI# [id number]      Chief Complaint      History of Present Illness   reviewed presenting problem as above with patient  cough and congestion for a few days, similar to episode last fall from which she recovered  non smoker  no hx of asthma      Health Status   Allergies:    Allergic Reactions (Selected)  Severity Not Documented  Amoxicillin (Hives)   Medications:  (Selected)   Prescriptions  Prescribed  Azithromycin 5 Day Dose Pack 250 mg oral tablet: 500mg day 1, 250 mg day 2-5, PO, Daily, # 6 tab(s), 0 Refill(s), Type: Maintenance, Pharmacy: [facility] #[id number], 500mg day 1, 250 mg day 2-5 Oral daily  albuterol 90 mcg/inh inhalation aerosol: 2 puff(s), inh, qid, Instructions: dispense with Valved Chamber please  use with spacer chamber, PRN: as needed for wheezing, # 18 gm, 0 Refill(s), Type: Maintenance, Pharmacy: [facility] #[id number], 2 puff(s) Inhale qid,PRN:as needed for wheezing,Ins...  predniSONE 20 mg oral tablet: = 1 tab(s) ( 20 mg ), Oral, bid, Instructions: with food or milk  last dose before 3pm, # 10 tab(s), 0 Refill(s), Type: Maintenance, Pharmacy: [facility] #[id number], 1 tab(s) Oral bid,x5 day(s),Instr:with food or milk; last dose before 3pm  Documented Medications  Documented  Nexplanon 68 mg subcutaneous implant: 1 EA ( 68 mg ), subcutaneous, once, 0 Refill(s), Type: Maintenance   Problem list:    All Problems  ADHD (Attention Deficit Hyperactivity Disorder) / ICD-9-.01 / Confirmed  Obese / ICD-9-.00 / Probable  ASCUS  with positive high risk HPV / SNOMED CT 309498482 / Confirmed  done 4/2015, needs repeat pap 4/2016      Histories   Past Medical History:    Active  ADHD (Attention Deficit Hyperactivity Disorder) (314.01)  Obese (278.00)   Family History:    Cancer  Father (Raj)  Comments:  8/3/2010 2:16 PM CDT - Tisha Katya DESIR  Melanoma  Heart disease  Father (Raj)  Grandfather (P)  Stroke  Father (Raj)  Hypercholesterolemia  Father (Raj)     Procedure history:    Ear tube placement in 1996 at 1 Years.   Social History:        Alcohol Assessment: Current            Current, 1-2 times per week      Tobacco Assessment: Denies Tobacco Use            Never      Substance Abuse Assessment: Denies Substance Abuse            Never      Nutrition and Health Assessment            Type of diet: Regular.      Exercise and Physical Activity Assessment: Does not exercise      Sexual Assessment            Sexually active: Yes.  Sexual orientation: Heterosexual.  Uses condoms: Yes.      Physical Examination   VS/Measurements   General:  Alert and oriented, No acute distress.    Eye:  Normal conjunctiva.    HENT:  Tympanic membranes are clear, Normal hearing, Oral mucosa is moist, No pharyngeal erythema.    Neck:  Supple, Non-tender, No lymphadenopathy.    Respiratory:  Respirations are non-labored, Symmetrical chest wall expansion, bilat wheezes.    Cardiovascular:  Normal rate, Regular rhythm, No murmur.    Musculoskeletal:  Normal range of motion, Normal gait.    Integumentary:  Warm, Dry, Pink, No rash.    Neurologic:  Alert, Oriented.    Psychiatric:  Cooperative.       Review / Management   Course:  improved air movement and reduced wheezes after albuterol neb.       Impression and Plan   Diagnosis     Bronchitis (PQZ44-FS J40).     Patient Instructions:       Counseled: Patient, Regarding diagnosis, Regarding treatment, Regarding medications, Verbalized understanding, Counseled on symptomatic management. Return to clinic for re  evaluation if worsening, simply not improving, or failure to resolve.   .    Orders     Orders (Selected)   Prescriptions  Prescribed  Azithromycin 5 Day Dose Pack 250 mg oral tablet: 500mg day 1, 250 mg day 2-5, PO, Daily, # 6 tab(s), 0 Refill(s), Type: Maintenance, Pharmacy: [facility] #[id number], 500mg day 1, 250 mg day 2-5 Oral daily  albuterol 90 mcg/inh inhalation aerosol: 2 puff(s), inh, qid, Instructions: dispense with Valved Chamber please  use with spacer chamber, PRN: as needed for wheezing, # 18 gm, 0 Refill(s), Type: Maintenance, Pharmacy: [facility] #[id number], 2 puff(s) Inhale qid,PRN:as needed for wheezing,Ins...  predniSONE 20 mg oral tablet: = 1 tab(s) ( 20 mg ), Oral, bid, Instructions: with food or milk  last dose before 3pm, # 10 tab(s), 0 Refill(s), Type: Maintenance, Pharmacy: [facility] #[id number], 1 tab(s) Oral bid,x5 day(s),Instr:with food or milk; last dose before 3pm.

## 2022-02-16 NOTE — PROGRESS NOTES
Patient:   SHUKRI MAN            MRN: [id number]            FIN: [id number]               Age:   25 years     Sex:  Female     :  1995   Associated Diagnoses:   Viral tonsillitis; Strep tonsillitis   Author:   Agustin Paredes PA-C      Visit Information      Date of Service: 2021 10:50 am  Performing Location: [facility]  Encounter#: [id number]      Primary Care Provider (PCP):  Namita Low    NPI# [id number]      Referring Provider:  Agustin Paredes PA-C    NPI# [id number]   Visit type:  New symptom.    Accompanied by:  No one.    Source of history:  Self, Medical record.    History limitation:  None.       Chief Complaint   2021 10:53 AM CST   Follow up strep, increase pain. no improvement after starting medication.      History of Present Illness             The patient presents with a sore throat.  The sore throat is described as tight.  The severity of the sore throat is moderate.  The timing/course of the sore throat is constant.  The sore throat has lasted for 3 day(s).  Associated symptoms consist of cough, headache, denies difficulty swallowing and denies fever.  CC above noted and confirmed with the patient..  Mon[place] as teen. Positive rapid strep on Wed. Has been on Zithromax 500 mg daily since then. No relief. Tylenol and NSAIDS not helping with pain. No fever. Amoxil causes hives.  CC above noted and confirmed with the patient..        Review of Systems   Constitutional:  Fatigue.    Eye:  Negative.    Ear/Nose/Mouth/Throat:  Sore throat.    Respiratory:  Negative.    Gastrointestinal:  Negative.    Neurologic:  Headache.       Health Status   Allergies:    Allergic Reactions (Selected)  Severity Not Documented  Amoxicillin (Hives)   Problem list:    All Problems  Obese / ICD-9-.00 / Probable  ASCUS with positive high risk HPV / SNOMED CT 713636728 / Confirmed  ADHD (Attention Deficit Hyperactivity Disorder) / ICD-9-.01 / Confirmed   Medications:   (Selected)   Prescriptions  Prescribed  Zithromax 500 mg oral tablet: = 1 tab(s) ( 500 mg ), PO, Daily, # 5 tab(s), 0 Refill(s), Type: Maintenance, Pharmacy: [facility] #[id number], 1 tab(s) Oral daily,x5 day(s), 272.2, lb, 10/02/19 8:49:00 CDT, Weight Measured  Documented Medications  Documented  Nexplanon 68 mg subcutaneous implant: 1 EA ( 68 mg ), subcutaneous, once, 0 Refill(s), Type: Maintenance,    Medications          *denotes recorded medication          Zithromax 500 mg oral tablet: 500 mg, 1 tab(s), PO, Daily, for 5 day(s), 5 tab(s), 0 Refill(s).          *Nexplanon 68 mg subcutaneous implant: 68 mg, 1 EA, subcutaneous, once, 0 Refill(s).          Histories   Past Medical History:    Active  ADHD (Attention Deficit Hyperactivity Disorder) (314.01)  Obese (278.00)   Family History:    Cancer  Father (Raj)  Comments:  8/3/2010 2:16 PM CDT - Katya Giles CMA  Melanoma  Heart disease  Father (Raj)  Grandfather (P)  Stroke  Father (Raj)  Hypercholesterolemia  Father (Raj)     Procedure history:    Encounter for initial prescription of implantable subdermal contraceptive (Z30.017) on 7/19/2019 at 24 Years.  Comments:  10/2/2019 9:27 AM CDT - Linda Montoya LPN  Nexplanon placed into left arm  [facility]  Ear tube placement in 1996 at 1 Years.   Social History:        Electronic Cigarette/Vaping Assessment            Electronic Cigarette Use: Never.      Alcohol Assessment: Current            Current, 1-2 times per week      Tobacco Assessment: Denies Tobacco Use            Never (less than 100 in lifetime)            Never      Substance Abuse Assessment: Denies Substance Abuse            Never      Nutrition and Health Assessment            Type of diet: Regular.      Exercise and Physical Activity Assessment: Does not exercise      Sexual Assessment            Sexually active: Yes.  Sexual orientation: Heterosexual.  Uses condoms: Yes.  ,        Electronic Cigarette/Vaping  Assessment            Electronic Cigarette Use: Never.      Alcohol Assessment: Current            Current, 1-2 times per week      Tobacco Assessment: Denies Tobacco Use            Never (less than 100 in lifetime)            Never      Substance Abuse Assessment: Denies Substance Abuse            Never      Nutrition and Health Assessment            Type of diet: Regular.      Exercise and Physical Activity Assessment: Does not exercise      Sexual Assessment            Sexually active: Yes.  Sexual orientation: Heterosexual.  Uses condoms: Yes.        Physical Examination   Vital Signs   2/12/2021 10:53 AM CST Temperature Tympanic 98.6 DegF    Peripheral Pulse Rate 82 bpm    Pulse Site Radial artery    HR Method Manual    Systolic Blood Pressure 118 mmHg    Diastolic Blood Pressure 64 mmHg    Mean Arterial Pressure 82 mmHg    BP Site Right arm    BP Method Manual      Measurements from flowsheet : Measurements   2/12/2021 10:53 AM CST Height Measured - Standard 68 in    Weight Measured - Standard 232 lb    BSA 2.24 m2    Body Mass Index 35.27 kg/m2  HI      General:  Alert and oriented, No acute distress.    Eye:  Pupils are equal, round and reactive to light, Extraocular movements are intact, Normal conjunctiva.    HENT:  Normocephalic, Tympanic membranes are clear, Oral mucosa is moist.         Throat: Tonsils ( Bilateral tonsils, Enlarged, Erythematous ), Pharynx ( Erythematous ).    Neck:  Supple.         Lymph nodes: Tonsillar.    Respiratory:  Lungs are clear to auscultation, Respirations are non-labored, Breath sounds are equal.    Cardiovascular:  Normal rate, Regular rhythm, No murmur.    Gastrointestinal:  Soft, Non-tender, Non-distended, No organomegaly.    Integumentary:  Warm, Moist, No rash.       Health Maintenance      Recommendations     Pending (in the next year)        OverDue           Alcohol Misuse Screen due  04/10/16  and every 1  year(s)           Depression Screen (Female) due  04/10/16   and every 1  year(s)           Influenza Vaccine due  09/01/20  and every 1  year(s)        Due            Intimate Partner Violence Screening due  02/12/21  and every 1  year(s)        Due In Future            Tobacco Use Screen (Female) not due until  02/10/22  and every 1  year(s)     Satisfied (in the past 1 year)        Satisfied            Body Mass Index Check on  02/12/21.           High Blood Pressure Screen (Female) on  02/12/21.           Obesity Screen and Counseling (Female) on  02/12/21.           Tobacco Use Screen (Female) on  02/10/21.          Review / Management   Results review:  Lab results   2/12/2021 11:14 AM CST WBC 10.3    RBC 5.04    Hgb 15.6 g/dL    Hct 46.4 %    MCV 92 fL    MCH 31.0 pg    MCHC 33.6 g/dL    RDW 13.1 %    Platelet 228    MPV 9.4 fL     .       Impression and Plan   Diagnosis     Strep tonsillitis ([id number] J03.00).     Course:  Worsening.    Plan:  Take medicine as prescribed, side effects discussed.  Tylenol/ibuprofen for fever and discomfort.  Push fluids.  RTC if not improving in 36-48 hours, prior if concerns as we have discussed.  .    Orders     Orders (Selected)   Prescriptions  Prescribed  clindamycin 300 mg oral capsule: = 1 cap(s) ( 300 mg ), Oral, q6 hrs, x 10 day(s), # 40 cap(s), 0 Refill(s), Type: Acute, Pharmacy: [facility] #[id number], 1 cap(s) Oral q6 hrs,x10 day(s), 68, in, 02/12/21 10:53:00 CST, Height Measured, 232, lb, 02/12/21 10:53:00 CST, Weight Brenda...  traMADol 50 mg oral tablet: = 1 tab(s) ( 50 mg ), Oral, q4 hrs, x 3 day(s), PRN: for pain, # 18 tab(s), 0 Refill(s), Type: Acute, Pharmacy: [facility] #[id number], 1 tab(s) Oral q4 hrs,x3 day(s),PRN:for pain, 68, in, 02/12/21 10:53:00 CST, Height Measured, 232, lb, 02/12/21...  Completed  Zithromax 500 mg oral tablet: = 1 tab(s) ( 500 mg ), PO, Daily, # 5 tab(s), 0 Refill(s), Type: Maintenance, Pharmacy: [facility] #[id number], 1 tab(s) Oral daily,x5 day(s), 272.2, lb, 10/02/19 8:49:00  CDT, Weight Measured.       Supportive therapy.  Recheck as we have discussed.  Culture pending.

## 2022-02-16 NOTE — NURSING NOTE
Comprehensive Intake Entered On:  2/10/2021 11:50 AM CST    Performed On:  2/10/2021 11:43 AM CST by Olivia Carranza CMA               Summary   Chief Complaint :   Sore throat x2 days. POC covid testing this morning with negative results. Verbal consent granted for video visit.   Menstrual Status :   Prophylaxis   Race :      Languages :   English   Ethnicity :   Not  or    Olivia Carranza CMA - 2/10/2021 11:43 AM CST   Health Status   Tobacco Use? :   Never smoker   Olivia Carranza CMA - 2/10/2021 11:43 AM CST   Meds / Allergies   (As Of: 2/10/2021 11:50:19 AM CST)   Allergies (Active)   amoxicillin  Estimated Onset Date:   Unspecified ; Reactions:   hives ; Created By:   Katya Giles CMA; Reaction Status:   Active ; Category:   Drug ; Substance:   amoxicillin ; Type:   Allergy ; Updated By:   Katya Giles CMA; Source:   Parent ; Reviewed Date:   2/10/2021 11:50 AM CST        Medication List   (As Of: 2/10/2021 11:50:19 AM CST)   Prescription/Discharge Order    cefuroxime  :   cefuroxime ; Status:   Prescribed ; Ordered As Mnemonic:   cefuroxime 500 mg oral tablet ; Simple Display Line:   500 mg, 1 tab(s), PO, bid, for 5 day(s), 10 tab(s), 0 Refill(s) ; Ordering Provider:   Namita Low; Catalog Code:   cefuroxime ; Order Dt/Tm:   10/2/2019 9:01:29 AM CDT          hydrocortisone/neomycin/polymyxin B otic  :   hydrocortisone/neomycin/polymyxin B otic ; Status:   Prescribed ; Ordered As Mnemonic:   hydrocortisone/neomycin/polymyxin B 1%-0.35%-10,000 units/mL otic solution ; Simple Display Line:   4 drop(s), Ear-Right, tid, for 5 day(s), 10 mL, 0 Refill(s) ; Ordering Provider:   Namita Low; Catalog Code:   hydrocortisone/neomycin/polymyxin B otic ; Order Dt/Tm:   10/2/2019 9:02:02 AM CDT            Home Meds    etonogestrel  :   etonogestrel ; Status:   Documented ; Ordered As Mnemonic:   Nexplanon 68 mg subcutaneous implant ; Simple Display Line:   68 mg, 1 EA, subcutaneous, once, 0  Refill(s) ; Catalog Code:   etonogestrel ; Order Dt/Tm:   1/31/2018 3:52:42 PM CST            ID Risk Screen   Recent Travel History :   No recent travel   Family Member Travel History :   No recent travel   Other Exposure to Infectious Disease :   Unknown   COVID-19 Testing Status :   Positive COVID-19 test greater than 30 days ago   Olivia Carranza CMA - 2/10/2021 11:43 AM CST   Social History   Social History   (As Of: 2/10/2021 11:50:19 AM CST)   Alcohol:  Current      Current, 1-2 times per week   (Last Updated: 1/8/2014 1:09:05 PM CST by Misti Roberts MA)          Tobacco:  Denies Tobacco Use      Never   (Last Updated: 3/10/2011 9:42:36 AM CST by Laura Polanco MA)   Never (less than 100 in lifetime)   (Last Updated: 2/10/2021 11:45:38 AM CST by Olivia Carranza CMA)          Electronic Cigarette/Vaping:        Electronic Cigarette Use: Never.   (Last Updated: 2/10/2021 11:45:42 AM CST by Olivia Carranza CMA)          Substance Abuse:  Denies Substance Abuse      Never   (Last Updated: 3/10/2011 9:42:45 AM CST by Laura Polanco MA)          Nutrition/Health:        Type of diet: Regular.   (Last Updated: 4/10/2015 8:31:18 AM CDT by Rhianna Trevizo CMA)          Exercise:  Does not exercise      (Last Updated: 1/8/2014 2:17:25 PM CST by Kim Ray )         Sexual:        Sexually active: Yes.  Sexual orientation: Heterosexual.  Uses condoms: Yes.   (Last Updated: 1/9/2013 2:13:05 PM CST by Asiya Toscano CMA)

## 2022-02-16 NOTE — NURSING NOTE
Comprehensive Intake Entered On:  2/12/2021 10:58 AM CST    Performed On:  2/12/2021 10:53 AM CST by Breann Moore LPN               Summary   Chief Complaint :   Follow up strep, increase pain. no improvement after starting medication.    Menstrual Status :   Prophylaxis   Weight Measured :   232 lb(Converted to: 232 lb 0 oz, 105.233 kg)    Height Measured :   68 in(Converted to: 5 ft 8 in, 172.72 cm)    Body Mass Index :   35.27 kg/m2 (HI)    Body Surface Area :   2.24 m2   Systolic Blood Pressure :   118 mmHg   Diastolic Blood Pressure :   64 mmHg   Mean Arterial Pressure :   82 mmHg   Peripheral Pulse Rate :   82 bpm   BP Site :   Right arm   Pulse Site :   Radial artery   BP Method :   Manual   HR Method :   Manual   Temperature Tympanic :   98.6 DegF(Converted to: 37.0 DegC)    Race :      Languages :   English   Ethnicity :   Not  or    Breann Moore LPN - 2/12/2021 10:53 AM CST   Health Status   Allergies Verified? :   Yes   Medication History Verified? :   Yes   Pre-Visit Planning Status :   Completed   Breann Moore LPN - 2/12/2021 10:53 AM CST   Consents   Consent for Immunization Exchange :   Consent Granted   Consent for Immunizations to Providers :   Consent Granted   Breann Moore LPN - 2/12/2021 10:53 AM CST   Problems   (As Of: 2/12/2021 10:58:18 AM CST)   Problems(Active)    ADHD (Attention Deficit Hyperactivity Disorder) (ICD-9-CM  :314.01 )  Name of Problem:   ADHD (Attention Deficit Hyperactivity Disorder) ; Recorder:   Jolie Ching; Confirmation:   Confirmed ; Classification:   Medical ; Code:   314.01 ; Contributor System:   [place] ; Last Updated:   2/28/2014 6:52 PM CST ; Life Cycle Date:   3/8/2011 ; Life Cycle Status:   Active ; Vocabulary:   ICD-9-CM        ASCUS with positive high risk HPV (SNOMED CT  :991007679 )  Name of Problem:   ASCUS with positive high risk HPV ; Recorder:   Namita Low; Confirmation:   Confirmed ;  Classification:   Medical ; Code:   741945255 ; Contributor System:   PowerChart ; Last Updated:   4/28/2015 10:26 AM CDT ; Life Cycle Date:   4/28/2015 ; Life Cycle Status:   Active ; Responsible Provider:   Namita Low; Vocabulary:   SNOMED CT   ; Comments:        4/28/2015 10:26 AM - Namita Low  done 4/2015, needs repeat pap 4/2016      Obese (ICD-9-CM  :278.00 )  Name of Problem:   Obese ; Recorder:   SYSTEM; Confirmation:   Probable ; Classification:   Medical ; Code:   278.00 ; Contributor System:   PowerChart ; Last Updated:   2/28/2014 6:52 PM CST ; Life Cycle Date:   3/8/2011 ; Life Cycle Status:   Active ; Vocabulary:   ICD-9-CM          Meds / Allergies   (As Of: 2/12/2021 10:58:18 AM CST)   Allergies (Active)   amoxicillin  Estimated Onset Date:   Unspecified ; Reactions:   hives ; Created By:   Katya Giles CMA; Reaction Status:   Active ; Category:   Drug ; Substance:   amoxicillin ; Type:   Allergy ; Updated By:   Katya Giles CMA; Source:   Parent ; Reviewed Date:   2/12/2021 10:56 AM CST        Medication List   (As Of: 2/12/2021 10:58:18 AM CST)   Prescription/Discharge Order    azithromycin  :   azithromycin ; Status:   Prescribed ; Ordered As Mnemonic:   Zithromax 500 mg oral tablet ; Simple Display Line:   500 mg, 1 tab(s), PO, Daily, for 5 day(s), 5 tab(s), 0 Refill(s) ; Ordering Provider:   Agustin Paredes PA-C; Catalog Code:   azithromycin ; Order Dt/Tm:   2/10/2021 3:44:24 PM CST          cefuroxime  :   cefuroxime ; Status:   Processing ; Ordered As Mnemonic:   cefuroxime 500 mg oral tablet ; Ordering Provider:   Namita Low; Action Display:   Complete ; Catalog Code:   cefuroxime ; Order Dt/Tm:   2/12/2021 10:55:55 AM CST          hydrocortisone/neomycin/polymyxin B otic  :   hydrocortisone/neomycin/polymyxin B otic ; Status:   Processing ; Ordered As Mnemonic:   hydrocortisone/neomycin/polymyxin B 1%-0.35%-10,000 units/mL otic solution ; Ordering Provider:   Ken  Namita WHALEY; Action Display:   Complete ; Catalog Code:   hydrocortisone/neomycin/polymyxin B otic ; Order Dt/Tm:   2/12/2021 10:56:06 AM CST            Home Meds    etonogestrel  :   etonogestrel ; Status:   Documented ; Ordered As Mnemonic:   Nexplanon 68 mg subcutaneous implant ; Simple Display Line:   68 mg, 1 EA, subcutaneous, once, 0 Refill(s) ; Catalog Code:   etonogestrel ; Order Dt/Tm:   1/31/2018 3:52:42 PM CST            ID Risk Screen   Recent Travel History :   No recent travel   Family Member Travel History :   No recent travel   Other Exposure to Infectious Disease :   Unknown   COVID-19 Testing Status :   No COVID-19 test performed   Breann Moore LPN - 2/12/2021 10:53 AM CST

## 2022-02-16 NOTE — LETTER
(Inserted Image. Unable to display)            May 27, 2021      SHUKRI MAN      [id number] [place] [id number]        Dear SHUKRI,    Thank you for selecting [facility] for your healthcare needs.  Below you will find the results of the recent tests done at our clinic.      Your recent urine culture results show positive growth of a bacteria that is responsive to the antibiotic which you were prescribed.  Please finish the full course of the antibiotic.  If your symptoms are not then resolved, or if they are worsening,  please let me know.   The screening for chlamydia and gonorrhea is negative.  Thank you.      Result Name Current Result Reference Range   Urine Culture ((A)) See comment 5/24/2021    Chlamydia RNA  NOT DETECTED 5/24/2021 NOT DETECTED -    Neisseria gonorrhoeae RNA  NOT DETECTED 5/24/2021 NOT DETECTED -        Please contact me or my assistant at [phone] if you have any questions or concerns.     Sincerely,        AFUA Childers-NP  Family Nurse Practitioner      What do your labs mean?  Below is a glossary of commonly ordered labs:  LDL   Bad Cholesterol   HDL   Good Cholesterol  AST/ALT   Liver Function   Cr/Creatinine   Kidney Function  Microalbumin   Kidney Function  BUN   Kidney Function  PSA   Prostate    TSH   Thyroid Hormone  HgbA1c   Diabetes Test   Hgb (Hemoglobin)   Red Blood Cells  WBC   White Blood Cell Count

## 2022-02-16 NOTE — TELEPHONE ENCOUNTER
---------------------  From: Henrry DESIRAngelique   Sent: 3/29/2019 2:26:56 PM CDT  Subject: General Message-?'s on Norovirus     Phone Message    PCP:   GEETA      Time of Call:  1405       Person Calling:  self  Phone number:  [phone]    Returned call at: 1415    Note:   Pt LM stating she believes she has the Norovirus c/o fever and body aches x Wed.  ? if too late to come in.  Returned call c/o sx started Wed sudden  c/o body aches, sweats, body aches.  She states now really only bothered with the diarrhea which is worse today.  Has had ~ 6loose stools today, stabbing like pains in abd but has lessened.  Denies any bloody stools.  Able to drink with no issues, voiding fine.  Reviewed per CDC sx of norovirus, sx of dehydration and discussed coming to UC this wknd if continued diarrhea.  Discussed still contagious for up to 2 days after sx stop per CDC.  Pt expressed understanding and had no further questions.Patient called again @ 0758 stating her fever, body aches and sweating stopped 4 days ago. Diarrhea continues following eating. No signs of dehydration or urinary concerns. Informed patient we can get her in if she chooses, otherwise to monitor and continue to sip liquids. She will monitor sx's for now.

## 2022-02-16 NOTE — TELEPHONE ENCOUNTER
Entered by Breann Moore LPN on February 12, 2021 10:08:15 AM CST  called patient and she has an in clinic appt at 11:00 today, Patient stated tested Wednesday with negative results.       ---------------------  From: Agustin Paredes PA-C   To: KIERAN [employer] ([id number]);     Sent: 2/12/2021 9:25:54 AM CST  Subject: General Message     If not better from strep, someone should see her in clinic. verify still negative for C-19 through testing at work.    KAH

## 2022-02-16 NOTE — LETTER
(Inserted Image. Unable to display)   October 07, 2019      SHUKRI MAN       [address], [place] [id number]        Dear SHUKRI,    Thank you for selecting [facility] for your healthcare needs.  Below you will find the results of the recent tests done at our clinic.      Thyroid test is very normal, Shukri. Thank you.      Result Name Current Result   TSH (mIU/L)  2.01 10/2/2019       Please contact me or my assistant at [phone] if you have any questions or concerns.     Sincerely,        AFUA Childers-NP  Family Nurse Practitioner      What do your labs mean?  Below is a glossary of commonly ordered labs:  LDL   Bad Cholesterol   HDL   Good Cholesterol  AST/ALT   Liver Function   Cr/Creatinine   Kidney Function  Microalbumin   Kidney Function  BUN   Kidney Function  PSA   Prostate    TSH   Thyroid Hormone  HgbA1c   Diabetes Test   Hgb (Hemoglobin)   Red Blood Cells  WBC   White Blood Cell Count

## 2022-07-14 ENCOUNTER — OFFICE VISIT (OUTPATIENT)
Dept: FAMILY MEDICINE | Facility: CLINIC | Age: 27
End: 2022-07-14
Payer: COMMERCIAL

## 2022-07-14 VITALS
BODY MASS INDEX: 35.65 KG/M2 | HEART RATE: 111 BPM | SYSTOLIC BLOOD PRESSURE: 104 MMHG | HEIGHT: 70 IN | WEIGHT: 249 LBS | DIASTOLIC BLOOD PRESSURE: 73 MMHG

## 2022-07-14 DIAGNOSIS — Z12.4 SCREENING FOR CERVICAL CANCER: ICD-10-CM

## 2022-07-14 DIAGNOSIS — Z23 NEED FOR DIPHTHERIA-TETANUS-PERTUSSIS (TDAP) VACCINE: Primary | ICD-10-CM

## 2022-07-14 DIAGNOSIS — Z00.00 ROUTINE GENERAL MEDICAL EXAMINATION AT A HEALTH CARE FACILITY: ICD-10-CM

## 2022-07-14 PROBLEM — B97.7 HUMAN PAPILLOMA VIRUS (HPV) INFECTION: Status: ACTIVE | Noted: 2022-07-14

## 2022-07-14 PROBLEM — E66.9 OBESITY: Status: ACTIVE | Noted: 2022-07-14

## 2022-07-14 PROBLEM — F90.9 ATTENTION DEFICIT HYPERACTIVITY DISORDER (ADHD): Status: ACTIVE | Noted: 2022-07-14

## 2022-07-14 PROCEDURE — 90715 TDAP VACCINE 7 YRS/> IM: CPT | Performed by: NURSE PRACTITIONER

## 2022-07-14 PROCEDURE — 99395 PREV VISIT EST AGE 18-39: CPT | Mod: 25 | Performed by: NURSE PRACTITIONER

## 2022-07-14 PROCEDURE — G0145 SCR C/V CYTO,THINLAYER,RESCR: HCPCS | Performed by: NURSE PRACTITIONER

## 2022-07-14 PROCEDURE — 90471 IMMUNIZATION ADMIN: CPT | Performed by: NURSE PRACTITIONER

## 2022-07-14 ASSESSMENT — ENCOUNTER SYMPTOMS
DIARRHEA: 1
ABDOMINAL PAIN: 1

## 2022-07-14 NOTE — PROGRESS NOTES
SUBJECTIVE:   CC: Sejal Reyes is an 27 year old woman who presents for preventive health visit.   She has Nexplanon in place and follows at the [facility] for that.  She has occasional spotting.  She is due for a Pap smear and agreeable to doing that today.  Had recent STI screening at the [facility] and does not want that today.'s HIV and hep C screening    Recent diarrhea seemed like a stomach bug and she tells me its resolved.      Patient has been advised of split billing requirements and indicates understanding: Yes     Healthy Habits:     Getting at least 3 servings of Calcium per day:  Yes    Bi-annual eye exam:  NO    Dental care twice a year:  NO    Sleep apnea or symptoms of sleep apnea:  None    Diet:  Regular (no restrictions)    Frequency of exercise:  None    Taking medications regularly:  Not Applicable    Medication side effects:  Not applicable    PHQ-2 Total Score: 0    Additional concerns today:  No        GI Problem  Onset: 7-11-22  Description: Has been having bad GI issues all this week.  Diarrhea, headache, body aches, and abdominal pain.  Fever chills and sweats.  Negative Covid test everyday this week.   Intensity: moderate  Progression of Symptoms:  improving  Therapies tried and outcome: acetaminophen somewhat effective, ibuprofen PM for sleep.      Today's PHQ-2 Score:   PHQ-2 ( 1999 Pfizer) 7/14/2022   Q1: Little interest or pleasure in doing things 0   Q2: Feeling down, depressed or hopeless 0   PHQ-2 Score 0   Q1: Little interest or pleasure in doing things Not at all   Q2: Feeling down, depressed or hopeless Not at all   PHQ-2 Score 0       Abuse: Current or Past (Physical, Sexual or Emotional) - No  Do you feel safe in your environment? Yes    Have you ever done Advance Care Planning? (For example, a Health Directive, POLST, or a discussion with a medical provider or your loved ones about your wishes): No, advance care planning information given to patient to review.  Patient declined  advance care planning discussion at this time.    Social History     Tobacco Use     Smoking status: Never Smoker     Smokeless tobacco: Never Used   Substance Use Topics     Alcohol use: Yes     If you drink alcohol do you typically have >3 drinks per day or >7 drinks per week? Yes      Alcohol Use 7/14/2022   Prescreen: >3 drinks/day or >7 drinks/week? Yes   AUDIT SCORE  6     AUDIT - Alcohol Use Disorders Identification Test - Reproduced from the World Health Organization Audit 2001 (Second Edition) 7/14/2022   1.  How often do you have a drink containing alcohol? 2 to 3 times a week   2.  How many drinks containing alcohol do you have on a typical day when you are drinking? 1 or 2   3.  How often do you have five or more drinks on one occasion? Weekly   4.  How often during the last year have you found that you were not able to stop drinking once you had started? Never   5.  How often during the last year have you failed to do what was normally expected of you because of drinking? Never   6.  How often during the last year have you needed a first drink in the morning to get yourself going after a heavy drinking session? Never   7.  How often during the last year have you had a feeling of guilt or remorse after drinking? Never   8.  How often during the last year have you been unable to remember what happened the night before because of your drinking? Never   9.  Have you or someone else been injured because of your drinking? No   10. Has a relative, friend, doctor or other health care worker been concerned about your drinking or suggested you cut down? No   TOTAL SCORE 6       Reviewed orders with patient.  Reviewed health maintenance and updated orders accordingly - Yes      Breast Cancer Screening:    FHS-7:   Breast CA Risk Assessment (FHS-7) 7/14/2022   Did any of your first-degree relatives have breast or ovarian cancer? No   Did any of your relatives have bilateral breast cancer? No   Did any man in your  family have breast cancer? No   Did any woman in your family have breast and ovarian cancer? No   Did any woman in your family have breast cancer before age 50 y? Yes   Do you have 2 or more relatives with breast and/or ovarian cancer? No   Do you have 2 or more relatives with breast and/or bowel cancer? No       History of abnormal Pap smear: NO - age 21-29 PAP every 3 years recommended     Reviewed and updated as needed this visit by clinical staff   Tobacco  Allergies  Meds   Med Hx  Surg Hx  Fam Hx  Soc Hx          Reviewed and updated as needed this visit by Provider                       Review of Systems   Gastrointestinal: Positive for abdominal pain and diarrhea.   Breasts:  Negative for tenderness.   Genitourinary: Negative for pelvic pain, vaginal bleeding and vaginal discharge.          OBJECTIVE:   There were no vitals taken for this visit.  Physical Exam  GENERAL: healthy, alert and no distress  EYES: Eyes grossly normal to inspection, PERRL and conjunctivae and sclerae normal  HENT: ear canals and TM's normal, nose and mouth without ulcers or lesions  NECK: no adenopathy, no asymmetry, masses, or scars and thyroid normal to palpation  RESP: lungs clear to auscultation - no rales, rhonchi or wheezes  BREAST: normal without masses, tenderness or nipple discharge and no palpable axillary masses or adenopathy  CV: regular rate and rhythm, normal S1 S2, no S3 or S4, no murmur, click or rub, no peripheral edema and peripheral pulses strong  ABDOMEN: soft, nontender, no hepatosplenomegaly, no masses and bowel sounds normal  MS: no gross musculoskeletal defects noted, no edema  SKIN: no suspicious lesions or rashes  NEURO: Normal strength and tone, mentation intact and speech normal  PSYCH: mentation appears normal, affect normal/bright        ASSESSMENT/PLAN:       ICD-10-CM    1. Need for diphtheria-tetanus-pertussis (Tdap) vaccine  Z23 TDAP VACCINE (Adacel, Boostrix)  [[id number]]   2. Routine general  "medical examination at a health care facility  Z00.00 TDAP VACCINE (Adacel, Boostrix)  [[id number]]   3. Screening for cervical cancer  Z12.4 PAP screen reflex to HPV if ASCUS - recommended age 25 - 29 years           COUNSELING:  Reviewed preventive health counseling, as reflected in patient instructions       Regular exercise       Healthy diet/nutrition       Family planning       Safe sex practices/STD prevention       Consider Hep C screening for all patients one time for ages 18-79 years       HIV screeninx in teen years, 1x in adult years, and at intervals if high risk    Estimated body mass index is 36.04 kg/m  as calculated from the following:    Height as of 20: 1.727 m (5' 8\").    Weight as of 20: 107.5 kg (237 lb).        She reports that she has never smoked. She has never used smokeless tobacco.      Counseling Resources:  ATP IV Guidelines  Pooled Cohorts Equation Calculator  Breast Cancer Risk Calculator  BRCA-Related Cancer Risk Assessment: FHS-7 Tool  FRAX Risk Assessment  ICSI Preventive Guidelines  Dietary Guidelines for Americans, 2010  USDA's MyPlate  ASA Prophylaxis  Lung CA Screening    Namita Rogers NP  [employer]  "

## 2022-07-19 LAB
BKR LAB AP GYN ADEQUACY: NORMAL
BKR LAB AP GYN INTERPRETATION: NORMAL
BKR LAB AP HPV REFLEX: NORMAL
BKR LAB AP PREVIOUS ABNORMAL: NORMAL
PATH REPORT.COMMENTS IMP SPEC: NORMAL
PATH REPORT.COMMENTS IMP SPEC: NORMAL
PATH REPORT.RELEVANT HX SPEC: NORMAL

## 2022-07-20 PROBLEM — B97.7 HUMAN PAPILLOMA VIRUS (HPV) INFECTION: Status: RESOLVED | Noted: 2022-07-14 | Resolved: 2022-07-20

## 2023-08-02 ENCOUNTER — OFFICE VISIT (OUTPATIENT)
Dept: FAMILY MEDICINE | Facility: CLINIC | Age: 28
End: 2023-08-02
Payer: COMMERCIAL

## 2023-08-02 VITALS
WEIGHT: 268 LBS | BODY MASS INDEX: 38.45 KG/M2 | HEART RATE: 72 BPM | RESPIRATION RATE: 20 BRPM | OXYGEN SATURATION: 98 % | SYSTOLIC BLOOD PRESSURE: 108 MMHG | DIASTOLIC BLOOD PRESSURE: 72 MMHG | TEMPERATURE: 98.2 F

## 2023-08-02 DIAGNOSIS — Z30.46 ENCOUNTER FOR REMOVAL AND REINSERTION OF NEXPLANON: Primary | ICD-10-CM

## 2023-08-02 LAB — HCG UR QL: NEGATIVE

## 2023-08-02 PROCEDURE — 11983 REMOVE/INSERT DRUG IMPLANT: CPT | Performed by: NURSE PRACTITIONER

## 2023-08-02 PROCEDURE — 81025 URINE PREGNANCY TEST: CPT | Performed by: NURSE PRACTITIONER

## 2023-08-02 RX ORDER — LEVONORGESTREL 1.5 MG/1
1.5 TABLET ORAL ONCE
Qty: 1 TABLET | Refills: 0 | Status: SHIPPED | OUTPATIENT
Start: 2023-08-02 | End: 2023-08-02

## 2023-08-02 ASSESSMENT — PAIN SCALES - GENERAL: PAINLEVEL: NO PAIN (0)

## 2023-08-02 NOTE — PROGRESS NOTES
NEXPLANON REMOVAL/REINSERTION:    Is a pregnancy test required: Yes.  Was it positive or negative?  Negative  Was a consent obtained?  Yes    Subjective: Sejal Reyes is a 28 year old No obstetric history on file. presents for Nexplanon.    Patient has been given the opportunity to ask questions about all forms of birth control, including all options appropriate for Sejal Reyes. Discussed that no method of birth control, except abstinence is 100% effective against pregnancy or sexually transmitted infection.     Sejal Reyes understands planning removal and reinsertion today    The entire removal and insertion procedure was reviewed with the patient, including care after placement.      /72 (BP Location: Right arm, Patient Position: Sitting)   Pulse 72   Temp 98.2  F (36.8  C) (Tympanic)   Resp 20   Wt 121.6 kg (268 lb)   LMP  (LMP Unknown)   SpO2 98%   BMI 38.45 kg/m      PROCEDURE NOTE: -- Nexplanon Removal/Insertion    Technique: On the left arm  Skin prep Betadine  Anesthesia 1% lidocaine, with epi  Procedure: Patient was placed supine with left arm exposed.  Implant located by palpitation. Evelyn was made 8-10 cm above medial epicondyle and a guiding evelyn 4 cm above the first.  Arm was prepped with Betadine. Incision point was anesthetized with 2 mL 1% lidocaine.     Small incision (<5mm) was made at distal end of palpable implant, curved hemostat or mosquito forceps was used to isolate the implant and bring it to the incision, the fibrous capsule containing the implant  was incised and the implant was removed intact.    After stretching the skin with thumb and index finger around the insertion site, skin punctured with the tip of the needle inserted at 30 degrees and then lowered to horizontal position. While lifting the skin with the tip of the needle, inserted the needle to its full length. Applicator was then stabilized and the slider was unlocked by pushing it slightly down. Slider moved  back completely until it stopped. Applicator was then removed.    Correct placement of the implant was confirmed by palpation in the patient's arm and visualizing the purple top of the obturator.   Bandage and pressure dressing applied to insertion site.  EBL: minimal    Complications: none    ASSESSMENT:     (Z30.46) Encounter for removal and reinsertion of Nexplanon  (primary encounter diagnosis)  Comment: tolerated well  Plan:         PLAN:    Given 's handouts, including when to have Nexplanon removed, list of danger s/sx, side effects and follow up recommended. Encouraged condom use for prevention of STD. Back up contraception advised for 7 days. Advised to call for any fever, for prolonged or severe pain or bleeding, abnormal vaginal dischage. She was advised to use pain medications (ibuprofen) as needed for mild to moderate pain.     Namita Rogers NP  No LMP recorded (lmp unknown). Patient has had an implant. . No allergy to betadine or shellfish. Patient declines STD screening  hCG Urine Qualitative   Date Value Ref Range Status   08/02/2023 Negative Negative Final     Comment:     This test is for screening purposes.  Results should be interpreted along with the clinical picture.  Confirmation testing is available if warranted by ordering [id number], HCG Quantitative Pregnancy.         /72 (BP Location: Right arm, Patient Position: Sitting)   Pulse 72   Temp 98.2  F (36.8  C) (Tympanic)   Resp 20   Wt 121.6 kg (268 lb)   LMP  (LMP Unknown)   SpO2 98%   BMI 38.45 kg/m      ASSESSMENT:     ICD-10-CM    1. Encounter for contraceptive management, unspecified type  Z30.9 HCG qualitative urine     HCG qualitative urine     levonorgestrel (PLAN B) 1.5 MG tablet      2. Surveillance of previously prescribed implantable subdermal contraceptive  Z30.46              PLAN:    Given 's handouts, including when to have Nexplanon removed, list of danger s/sx, side effects and follow up  recommended. Encouraged condom use for prevention of STD. Back up contraception advised for 7 days. Advised to call for any fever, for prolonged or severe pain or bleeding, abnormal vaginal dischage. She was advised to use pain medications (ibuprofen) as needed for mild to moderate pain.     Namita Rogers, NP

## 2023-08-02 NOTE — PROGRESS NOTES
{PROVIDER CHARTING PREFERENCE:172070}    Subjective   Sejal is a 28 year old, presenting for the following health issues:  Contraception (Nexplanon removal and reinsertion.)      8/2/2023     8:50 AM   Additional Questions   Roomed by Ricki   Accompanied by n/a       Contraception    History of Present Illness       Reason for visit:  Birth Control    She eats 2-3 servings of fruits and vegetables daily.She consumes 1 sweetened beverage(s) daily.She exercises with enough effort to increase her heart rate 20 to 29 minutes per day.  She exercises with enough effort to increase her heart rate 5 days per week.   She is taking medications regularly.       {SUPERLIST (Optional):518383}  {additonal problems for provider to add (Optional):150492}      Review of Systems   {ROS COMP (Optional):677662}      Objective    /72 (BP Location: Right arm, Patient Position: Sitting)   Pulse 72   Temp 98.2  F (36.8  C) (Tympanic)   Resp 20   Wt 121.6 kg (268 lb)   LMP  (LMP Unknown)   SpO2 98%   BMI 38.45 kg/m    Body mass index is 38.45 kg/m .  Physical Exam   {Exam List (Optional):767599}    {Diagnostic Test Results (Optional):707019}    {AMBULATORY ATTESTATION (Optional):190580}            Answers submitted by the patient for this visit:  General Questionnaire (Submitted on 8/2/2023)  Chief Complaint: Chronic problems general questions HPI Form  What is the reason for your visit today? : Birth Control  How many servings of fruits and vegetables do you eat daily?: 2-3  On average, how many sweetened beverages do you drink each day (Examples: soda, juice, sweet tea, etc.  Do NOT count diet or artificially sweetened beverages)?: 1  How many minutes a day do you exercise enough to make your heart beat faster?: 20 to 29  How many days a week do you exercise enough to make your heart beat faster?: 5  How many days per week do you miss taking your medication?: 0

## 2023-10-22 ENCOUNTER — HEALTH MAINTENANCE LETTER (OUTPATIENT)
Age: 28
End: 2023-10-22

## 2024-12-14 ENCOUNTER — HEALTH MAINTENANCE LETTER (OUTPATIENT)
Age: 29
End: 2024-12-14